# Patient Record
Sex: MALE | Race: WHITE | NOT HISPANIC OR LATINO
[De-identification: names, ages, dates, MRNs, and addresses within clinical notes are randomized per-mention and may not be internally consistent; named-entity substitution may affect disease eponyms.]

---

## 2017-03-17 ENCOUNTER — MEDICATION RENEWAL (OUTPATIENT)
Age: 22
End: 2017-03-17

## 2017-04-28 ENCOUNTER — MEDICATION RENEWAL (OUTPATIENT)
Age: 22
End: 2017-04-28

## 2017-05-01 ENCOUNTER — RX RENEWAL (OUTPATIENT)
Age: 22
End: 2017-05-01

## 2017-06-07 ENCOUNTER — APPOINTMENT (OUTPATIENT)
Dept: PEDIATRIC DEVELOPMENTAL SERVICES | Facility: CLINIC | Age: 22
End: 2017-06-07

## 2017-06-07 VITALS
DIASTOLIC BLOOD PRESSURE: 74 MMHG | SYSTOLIC BLOOD PRESSURE: 104 MMHG | HEIGHT: 67.5 IN | BODY MASS INDEX: 25.69 KG/M2 | WEIGHT: 165.6 LBS | HEART RATE: 82 BPM

## 2017-09-07 ENCOUNTER — MEDICATION RENEWAL (OUTPATIENT)
Age: 22
End: 2017-09-07

## 2017-10-24 ENCOUNTER — RX RENEWAL (OUTPATIENT)
Age: 22
End: 2017-10-24

## 2017-12-07 ENCOUNTER — MEDICATION RENEWAL (OUTPATIENT)
Age: 22
End: 2017-12-07

## 2018-02-20 ENCOUNTER — RX RENEWAL (OUTPATIENT)
Age: 23
End: 2018-02-20

## 2018-03-19 ENCOUNTER — APPOINTMENT (OUTPATIENT)
Dept: PEDIATRIC DEVELOPMENTAL SERVICES | Facility: CLINIC | Age: 23
End: 2018-03-19
Payer: COMMERCIAL

## 2018-03-19 VITALS
HEIGHT: 67.62 IN | DIASTOLIC BLOOD PRESSURE: 76 MMHG | BODY MASS INDEX: 26.22 KG/M2 | SYSTOLIC BLOOD PRESSURE: 114 MMHG | WEIGHT: 171 LBS | HEART RATE: 60 BPM

## 2018-03-19 PROCEDURE — 99214 OFFICE O/P EST MOD 30 MIN: CPT

## 2018-05-15 ENCOUNTER — RX RENEWAL (OUTPATIENT)
Age: 23
End: 2018-05-15

## 2018-06-27 ENCOUNTER — MEDICATION RENEWAL (OUTPATIENT)
Age: 23
End: 2018-06-27

## 2018-09-04 ENCOUNTER — RX RENEWAL (OUTPATIENT)
Age: 23
End: 2018-09-04

## 2018-10-10 ENCOUNTER — RX RENEWAL (OUTPATIENT)
Age: 23
End: 2018-10-10

## 2018-11-07 ENCOUNTER — MEDICATION RENEWAL (OUTPATIENT)
Age: 23
End: 2018-11-07

## 2019-01-10 ENCOUNTER — RX RENEWAL (OUTPATIENT)
Age: 24
End: 2019-01-10

## 2019-01-17 ENCOUNTER — RX RENEWAL (OUTPATIENT)
Age: 24
End: 2019-01-17

## 2019-01-18 ENCOUNTER — RX CHANGE (OUTPATIENT)
Age: 24
End: 2019-01-18

## 2019-01-22 ENCOUNTER — APPOINTMENT (OUTPATIENT)
Dept: PEDIATRIC DEVELOPMENTAL SERVICES | Facility: CLINIC | Age: 24
End: 2019-01-22
Payer: COMMERCIAL

## 2019-01-22 VITALS
HEIGHT: 67.45 IN | BODY MASS INDEX: 25.24 KG/M2 | DIASTOLIC BLOOD PRESSURE: 74 MMHG | SYSTOLIC BLOOD PRESSURE: 92 MMHG | WEIGHT: 162.7 LBS | HEART RATE: 72 BPM

## 2019-01-22 DIAGNOSIS — R53.83 OTHER FATIGUE: ICD-10-CM

## 2019-01-22 PROCEDURE — 99214 OFFICE O/P EST MOD 30 MIN: CPT

## 2019-02-20 ENCOUNTER — RX RENEWAL (OUTPATIENT)
Age: 24
End: 2019-02-20

## 2019-03-27 ENCOUNTER — MEDICATION RENEWAL (OUTPATIENT)
Age: 24
End: 2019-03-27

## 2019-04-18 ENCOUNTER — MEDICATION RENEWAL (OUTPATIENT)
Age: 24
End: 2019-04-18

## 2019-05-22 ENCOUNTER — RX RENEWAL (OUTPATIENT)
Age: 24
End: 2019-05-22

## 2019-06-17 ENCOUNTER — RX RENEWAL (OUTPATIENT)
Age: 24
End: 2019-06-17

## 2019-06-19 ENCOUNTER — APPOINTMENT (OUTPATIENT)
Dept: PEDIATRIC DEVELOPMENTAL SERVICES | Facility: CLINIC | Age: 24
End: 2019-06-19

## 2019-07-15 ENCOUNTER — APPOINTMENT (OUTPATIENT)
Dept: PEDIATRIC DEVELOPMENTAL SERVICES | Facility: CLINIC | Age: 24
End: 2019-07-15
Payer: COMMERCIAL

## 2019-07-15 VITALS
DIASTOLIC BLOOD PRESSURE: 66 MMHG | HEIGHT: 67.5 IN | WEIGHT: 160 LBS | SYSTOLIC BLOOD PRESSURE: 99 MMHG | HEART RATE: 88 BPM | BODY MASS INDEX: 24.82 KG/M2

## 2019-07-15 PROCEDURE — 99213 OFFICE O/P EST LOW 20 MIN: CPT

## 2019-07-15 PROCEDURE — XXXXX: CPT

## 2019-08-22 ENCOUNTER — RX RENEWAL (OUTPATIENT)
Age: 24
End: 2019-08-22

## 2019-09-18 ENCOUNTER — MEDICATION RENEWAL (OUTPATIENT)
Age: 24
End: 2019-09-18

## 2019-10-21 ENCOUNTER — MEDICATION RENEWAL (OUTPATIENT)
Age: 24
End: 2019-10-21

## 2019-11-25 ENCOUNTER — MEDICATION RENEWAL (OUTPATIENT)
Age: 24
End: 2019-11-25

## 2019-11-27 ENCOUNTER — MEDICATION RENEWAL (OUTPATIENT)
Age: 24
End: 2019-11-27

## 2019-11-30 ENCOUNTER — MOBILE ON CALL (OUTPATIENT)
Age: 24
End: 2019-11-30

## 2019-12-03 ENCOUNTER — MEDICATION RENEWAL (OUTPATIENT)
Age: 24
End: 2019-12-03

## 2019-12-12 ENCOUNTER — MEDICATION RENEWAL (OUTPATIENT)
Age: 24
End: 2019-12-12

## 2019-12-23 ENCOUNTER — RX RENEWAL (OUTPATIENT)
Age: 24
End: 2019-12-23

## 2019-12-24 ENCOUNTER — RX RENEWAL (OUTPATIENT)
Age: 24
End: 2019-12-24

## 2019-12-26 ENCOUNTER — RX RENEWAL (OUTPATIENT)
Age: 24
End: 2019-12-26

## 2020-04-07 ENCOUNTER — APPOINTMENT (OUTPATIENT)
Dept: PEDIATRIC DEVELOPMENTAL SERVICES | Facility: CLINIC | Age: 25
End: 2020-04-07
Payer: COMMERCIAL

## 2020-04-07 PROCEDURE — 99213 OFFICE O/P EST LOW 20 MIN: CPT | Mod: GQ

## 2020-11-11 ENCOUNTER — APPOINTMENT (OUTPATIENT)
Dept: PEDIATRIC DEVELOPMENTAL SERVICES | Facility: CLINIC | Age: 25
End: 2020-11-11
Payer: COMMERCIAL

## 2020-11-11 DIAGNOSIS — K59.09 OTHER CONSTIPATION: ICD-10-CM

## 2020-11-11 PROCEDURE — 99213 OFFICE O/P EST LOW 20 MIN: CPT | Mod: 95

## 2020-11-11 PROCEDURE — ZZZZZ: CPT

## 2021-04-22 ENCOUNTER — APPOINTMENT (OUTPATIENT)
Dept: CARDIOLOGY | Facility: CLINIC | Age: 26
End: 2021-04-22
Payer: COMMERCIAL

## 2021-04-22 ENCOUNTER — NON-APPOINTMENT (OUTPATIENT)
Age: 26
End: 2021-04-22

## 2021-04-22 VITALS
OXYGEN SATURATION: 99 % | BODY MASS INDEX: 26.06 KG/M2 | HEART RATE: 74 BPM | SYSTOLIC BLOOD PRESSURE: 136 MMHG | WEIGHT: 168 LBS | DIASTOLIC BLOOD PRESSURE: 89 MMHG | HEIGHT: 67.5 IN

## 2021-04-22 DIAGNOSIS — Z91.89 OTHER SPECIFIED PERSONAL RISK FACTORS, NOT ELSEWHERE CLASSIFIED: ICD-10-CM

## 2021-04-22 PROCEDURE — 93000 ELECTROCARDIOGRAM COMPLETE: CPT

## 2021-04-22 PROCEDURE — 99204 OFFICE O/P NEW MOD 45 MIN: CPT

## 2021-04-22 PROCEDURE — 99072 ADDL SUPL MATRL&STAF TM PHE: CPT

## 2021-06-20 ENCOUNTER — TRANSCRIPTION ENCOUNTER (OUTPATIENT)
Age: 26
End: 2021-06-20

## 2022-02-03 ENCOUNTER — APPOINTMENT (OUTPATIENT)
Dept: PEDIATRIC DEVELOPMENTAL SERVICES | Facility: CLINIC | Age: 27
End: 2022-02-03
Payer: COMMERCIAL

## 2022-02-03 PROCEDURE — 99213 OFFICE O/P EST LOW 20 MIN: CPT | Mod: 95

## 2022-06-16 ENCOUNTER — APPOINTMENT (OUTPATIENT)
Dept: PEDIATRIC DEVELOPMENTAL SERVICES | Facility: CLINIC | Age: 27
End: 2022-06-16

## 2022-06-21 ENCOUNTER — APPOINTMENT (OUTPATIENT)
Dept: ORTHOPEDIC SURGERY | Facility: CLINIC | Age: 27
End: 2022-06-21
Payer: COMMERCIAL

## 2022-06-21 VITALS — WEIGHT: 165 LBS | HEIGHT: 68 IN | BODY MASS INDEX: 25.01 KG/M2

## 2022-06-21 DIAGNOSIS — M79.18 MYALGIA, OTHER SITE: ICD-10-CM

## 2022-06-21 DIAGNOSIS — S83.511A SPRAIN OF ANTERIOR CRUCIATE LIGAMENT OF RIGHT KNEE, INITIAL ENCOUNTER: ICD-10-CM

## 2022-06-21 PROCEDURE — 99204 OFFICE O/P NEW MOD 45 MIN: CPT

## 2022-06-21 PROCEDURE — 73564 X-RAY EXAM KNEE 4 OR MORE: CPT | Mod: RT

## 2022-06-21 RX ORDER — NAPROXEN 500 MG/1
500 TABLET ORAL TWICE DAILY
Qty: 60 | Refills: 0 | Status: ACTIVE | COMMUNITY
Start: 2022-06-21 | End: 1900-01-01

## 2022-06-21 NOTE — HISTORY OF PRESENT ILLNESS
[de-identified] : 26 year old male  (LHD ,   )  right knee pain since 6/18/22 while playing basketball took a step and knee twisted and felt something deep in knee.\par The pain is located  anterior, medial and deep\par The pain is associated with instability, catching, buckling\par Worse with activity and better at rest.\par Has tried ice, advil\par

## 2022-06-21 NOTE — ASSESSMENT
[FreeTextEntry1] : Right X-Ray Examination of the KNEE (4 views): there are no fractures, subluxations or dislocations.\par \par Due to the patients instability along with pain, effusion, and pos lachman test on exam we will get an mri to eval for ACL tear\par \par - The patient was advised to apply ice (wrapped in a towel or protective covering) to the area daily (20 minutes at a time, 2-4X/day).\par - The patient was advised to modify their activities.\par - naproyn rx\par - Patient was given a prescription for an anti-inflammatory medication.  They will take it for the next week and then on an as needed basis, as long as there are no medical contra-indications.  Patient is counseled on possible GI, renal, and cardiovascular side effects.\par - f/u after mri\par

## 2022-06-21 NOTE — IMAGING
[de-identified] : \par RIGHT KNEE\par Inspection: moderate effusion\par Palpation: medial and lateral joint line tenderness\par Range of Motion: 3-125 degrees.\par Strength: Quadriceps strength is 4+/5 Hamstring strength is 5/5\par Neurological: light touch is intact throughout \par Ligament Stability and Special Tests: \par McMurrays: positive\par Lachman: positive\par Pivot Shift: positive\par Posterior Drawer: neg\par Valgus: neg\par Varus: neg\par Patella Apprehension: neg\par Patella Maltracking: neg\par

## 2022-06-28 ENCOUNTER — FORM ENCOUNTER (OUTPATIENT)
Age: 27
End: 2022-06-28

## 2022-06-29 ENCOUNTER — APPOINTMENT (OUTPATIENT)
Dept: MRI IMAGING | Facility: CLINIC | Age: 27
End: 2022-06-29

## 2022-06-29 PROCEDURE — 73721 MRI JNT OF LWR EXTRE W/O DYE: CPT | Mod: RT

## 2022-07-05 ENCOUNTER — APPOINTMENT (OUTPATIENT)
Dept: ORTHOPEDIC SURGERY | Facility: CLINIC | Age: 27
End: 2022-07-05

## 2022-07-05 VITALS — HEIGHT: 68 IN | BODY MASS INDEX: 25.01 KG/M2 | WEIGHT: 165 LBS

## 2022-07-05 DIAGNOSIS — M23.8X1 OTHER INTERNAL DERANGEMENTS OF RIGHT KNEE: ICD-10-CM

## 2022-07-05 DIAGNOSIS — M23.41 LOOSE BODY IN KNEE, RIGHT KNEE: ICD-10-CM

## 2022-07-05 PROCEDURE — 99214 OFFICE O/P EST MOD 30 MIN: CPT

## 2022-07-05 NOTE — DISCUSSION/SUMMARY
[de-identified] : The patient was advised of the diagnosis. The natural history of the pathology was explained in full to the patient in layman's terms. Several different treatment options were discussed and explained to the patient including the risks and benefits of both surgical and non-surgical treatments.\par \par The risks, benefits, and alternatives to surgical arthroscopy of the right knee with abrasion chondroplasty, loose body removal, and articular cartilage biopsy (for subsequent autologous chondrocyte implantation) were reviewed with the patient.   Specifically, I reviewed that any anterior knee pain may paradoxically worsen for the first six weeks following arthroscopy due to quadriceps weakness.  We also discussed that the risks of surgery include but are not limited to pain, infection (superficial or deep), bleeding, vascular injury, numbness, tingling, nerve damage (direct or indirect), scarring, wound breakdown, failure to resolve symptoms, symptom recurrence, the need for further surgery as well as medical complications such as blood clots, pulmonary embolism, heart attack, stroke, and other anesthesia complications including even death.  \par \par They understood all the risks, accepted them, and understood that other complications could occur that are not mentioned above.  The intraoperative plan, post-operative plan, post-operative expectations and limitations were explained in full.  Expectations from non-surgical treatment were explained in full as well.  They demonstrated a complete understanding of the treatment alternatives and requested to proceed with surgery.  This will be scheduled accordingly.\par

## 2022-07-05 NOTE — HISTORY OF PRESENT ILLNESS
[de-identified] : 26 year old male  (LHD ,   )  right knee pain since 6/18/22 while playing basketball took a step and knee twisted and felt something deep in knee.\par The pain is located  anterior, medial and deep\par The pain is associated with instability, catching, buckling\par Worse with activity and better at rest.\par Has tried ice, advil, activity mod\par \par 7/5/22 - has been mod activiyt, using ice, nsaids cont catching and locking along with buckling, had mri\par

## 2022-07-05 NOTE — ASSESSMENT
[FreeTextEntry1] : mri right knee 6/29/22 - lfc chondral defect 1.2cm AP X 1.4cm ML, 1.5cm loose body in lateral gutter, synov, eff\par \par - We discussed their diagnosis and treatment options at length. \par - Given their active lifestyle along with pain and mechanical symptoms they are a surgical candidate.\par - The risks, benefits, and alternatives to right knee abrasion chondroplasty, loose body removal and articular cartilage biopsy were discussed with the patient, all questions were answered.\par

## 2022-07-05 NOTE — IMAGING
[de-identified] : \par RIGHT KNEE\par Inspection: moderate effusion\par Palpation: medial and lateral joint line tenderness\par Range of Motion: 3-125 degrees.\par Strength: Quadriceps strength is 4+/5 Hamstring strength is 5/5\par Neurological: light touch is intact throughout \par Ligament Stability and Special Tests: \par McMurrays: positive\par Lachman: neg\par Pivot Shift: neg\par Posterior Drawer: neg\par Valgus: neg\par Varus: neg\par Patella Apprehension: neg\par Patella Maltracking: neg\par

## 2022-07-05 NOTE — DATA REVIEWED
[MRI] : MRI [Right] : of the right [Knee] : knee [I independently reviewed and interpreted images and report] : I independently reviewed and interpreted images and report

## 2022-07-19 ENCOUNTER — APPOINTMENT (OUTPATIENT)
Dept: PEDIATRIC DEVELOPMENTAL SERVICES | Facility: CLINIC | Age: 27
End: 2022-07-19

## 2022-08-08 ENCOUNTER — APPOINTMENT (OUTPATIENT)
Dept: PEDIATRIC DEVELOPMENTAL SERVICES | Facility: CLINIC | Age: 27
End: 2022-08-08

## 2022-08-15 ENCOUNTER — APPOINTMENT (OUTPATIENT)
Dept: PEDIATRIC DEVELOPMENTAL SERVICES | Facility: CLINIC | Age: 27
End: 2022-08-15

## 2022-08-15 PROCEDURE — 99214 OFFICE O/P EST MOD 30 MIN: CPT | Mod: 95

## 2022-09-02 ENCOUNTER — EMERGENCY (EMERGENCY)
Facility: HOSPITAL | Age: 27
LOS: 1 days | Discharge: HOME | End: 2022-09-02
Attending: INTERNAL MEDICINE | Admitting: EMERGENCY MEDICINE
Payer: COMMERCIAL

## 2022-09-02 VITALS
DIASTOLIC BLOOD PRESSURE: 94 MMHG | HEART RATE: 96 BPM | SYSTOLIC BLOOD PRESSURE: 143 MMHG | WEIGHT: 164.91 LBS | TEMPERATURE: 98 F | HEIGHT: 68 IN | RESPIRATION RATE: 16 BRPM | OXYGEN SATURATION: 98 %

## 2022-09-02 VITALS
RESPIRATION RATE: 16 BRPM | OXYGEN SATURATION: 98 % | HEART RATE: 87 BPM | SYSTOLIC BLOOD PRESSURE: 124 MMHG | DIASTOLIC BLOOD PRESSURE: 82 MMHG | TEMPERATURE: 98 F

## 2022-09-02 PROCEDURE — 99283 EMERGENCY DEPT VISIT LOW MDM: CPT

## 2022-09-02 PROCEDURE — 99284 EMERGENCY DEPT VISIT MOD MDM: CPT

## 2022-09-02 RX ORDER — ACETAMINOPHEN 500 MG
650 TABLET ORAL ONCE
Refills: 0 | Status: COMPLETED | OUTPATIENT
Start: 2022-09-02 | End: 2022-09-02

## 2022-09-02 RX ORDER — IBUPROFEN 200 MG
600 TABLET ORAL ONCE
Refills: 0 | Status: COMPLETED | OUTPATIENT
Start: 2022-09-02 | End: 2022-09-02

## 2022-09-02 RX ORDER — AZTREONAM 2 G
1 VIAL (EA) INJECTION
Qty: 14 | Refills: 0
Start: 2022-09-02 | End: 2022-09-08

## 2022-09-02 RX ADMIN — Medication 600 MILLIGRAM(S): at 21:10

## 2022-09-02 RX ADMIN — Medication 650 MILLIGRAM(S): at 21:28

## 2022-09-02 RX ADMIN — Medication 1 TABLET(S): at 19:48

## 2022-09-02 RX ADMIN — Medication 600 MILLIGRAM(S): at 20:12

## 2022-09-02 RX ADMIN — Medication 650 MILLIGRAM(S): at 22:45

## 2022-09-02 NOTE — ED PROVIDER NOTE - PHYSICAL EXAMINATION
Constitutional: Awake, Alert, non-toxic. NAD. Well appearing, well nourished.   HEAD: Normocephalic, atraumatic.   EYES: PERRL, EOM intact, conjunctiva and sclera are clear bilaterally. No raccoon eyes.   ENT: No rizzo sign. No rhinorrhea, normal pharynx, patent, no tonsillar exudate or enlargement, mucous membranes pink/moist, no erythema, no drooling or stridor.   NECK: Supple, non-tender; no cervical LAD, no JVD, no goiter.  BACK: No midline or paraspinal TTP of cervical/thoracic/lumbar spine, FROM. No ecchymosis or hematomas. (+) abrasion to right upper back without TTP.   CARDIOVASCULAR: Normal S1, S2; regular rate and rhythm.  RESPIRATORY: Normal respiratory effort; breath sounds CTAB, no wheezes, rhonchi, or rales. Speaking in full sentences. No accessory muscle use.   ABDOMEN: Soft; non-tender, non-distended, no CVA TTP  EXTREMITIES: Full passive and active ROM in all extremities; (+) right knee surgical wound with minimal opening to mid wound, no discharge, no active bleeding, knee and hips non-tender to palpation; distal pulses palpable and symmetric, no edema, no crepitus or step off  SKIN: Warm, dry; good skin turgor, no apparent lesions or rashes, no ecchymosis, brisk capillary refill.  NEURO: A&O x3. Sensory and motor functions are grossly intact. Speech is normal. Appearance and judgement seem appropriate for gender and age. No neurological deficits. Neurovascular sensation intact motor function 5/5 of upper and lower extremities

## 2022-09-02 NOTE — ED PROVIDER NOTE - OBJECTIVE STATEMENT
26 y/o male with PMHx Autism presents today due to fall on steps. Reports fell onto back and opened right knee wound. Reports he had surgery 8 days ago on right knee. Reports they sent photo to orthopedic who requested steri-strips, dressing, and abx. Denies head injury, back pain, hip pain, rib pain, cp, sob, knee pain, numbness/weakness, or any other complaints.

## 2022-09-02 NOTE — ED ADULT TRIAGE NOTE - NS ED NURSE BANDS TYPE
Patient missed a HHA} visit from [facility] on 12/20/2021     Reason: pt was sick with covid and pt daughter said she was feeling to bad       For your records only.   As per home health protocol, MD must be notified of missed/cancelled visits; therefore the prescribed frequency was not met. 
Name band;

## 2022-09-02 NOTE — ED PROVIDER NOTE - CLINICAL SUMMARY MEDICAL DECISION MAKING FREE TEXT BOX
28 y/o male with PMHx   right knee arthroscopic surgery 8 days ago, He fell while attempting to ambulate and his surgical wound partially opened  Denies head injury, back pain, hip pain, rib pain, cp, sob, knee pain, numbness/weakness, or any other complaints. We contacted the patients orthopedist who suggested that the surgical wound be reinforced with Steri-strips, he was stable at discharge with O/P with his orthopedist

## 2022-09-02 NOTE — ED ADULT NURSE NOTE - OBJECTIVE STATEMENT
patient has right knee replacement surgery last week and fell today while coming down stairs, fell backward and hit his shoulder, denies knee trauma but stitches opened and started bleeding from the suture. no loc, pending MD's eval. will continue to monitor.

## 2022-09-02 NOTE — ED PROVIDER NOTE - PROGRESS NOTE DETAILS
Steri-strips applied with dressing and ace bandage. All questions were answered. Discussed the importance of prompt, close medical follow-up. Patient will return with any changes, concerns or persistent/worsening symptoms.  Patient verbalized understanding.

## 2022-09-02 NOTE — ED ADULT NURSE NOTE - NSIMPLEMENTINTERV_GEN_ALL_ED
Implemented All Fall with Harm Risk Interventions:  [place] to call system. Call bell, personal items and telephone within reach. Instruct patient to call for assistance. Room bathroom lighting operational. Non-slip footwear when patient is off stretcher. Physically safe environment: no spills, clutter or unnecessary equipment. Stretcher in lowest position, wheels locked, appropriate side rails in place. Provide visual cue, wrist band, yellow gown, etc. Monitor gait and stability. Monitor for mental status changes and reorient to person, place, and time. Review medications for side effects contributing to fall risk. Reinforce activity limits and safety measures with patient and family. Provide visual clues: red socks.

## 2022-09-02 NOTE — ED ADULT NURSE REASSESSMENT NOTE - AS TEMP SITE
8/25 Assessed, confirmed PCP and address. Pt has cane available. Pt lives alone but has siblings available in the area. Pt is [employer] and drives. Pt offered transitional visit but declined. magda  
oral

## 2022-09-02 NOTE — ED PROVIDER NOTE - MUSCULOSKELETAL NEGATIVE STATEMENT, MLM
Include Rx 2 When Rendering Additional Prescriptions: No Treatment Device Design After Initial Simulation Justification (Will Render If Bill For Treatment Devices = Yes): The patient is status post radiation simulation and is evaluated as to the use of additional devices for shielding and placement for radiation therapy. Computed Treatment Time In Min (Will Render The Same As Calculated Treatment Time If Left Blank): per device Fractionation Number (Evaluation): 10 Assessment: Appropriate reaction Treatment Margins In Cm: 0.5 Intro Statement (Will Not Render If Left Blank): The patient is undergoing superficial radiation therapy for skin cancer and presents for weekly evaluation and management.  Per protocol and as documented on the flow sheet, the patient was questioned as to subjective redness, pruritus, pain, drainage, fatigue, or any other symptoms.  Objectively, the radiation area was evaluated with regards to erythema, atrophy, scale, crusting, erosion, ulceration, edema, purpura, tenderness, warmth, drainage, and any other findings.  The plan was extensively reviewed including the dose, and dosing schedule.  The simulation and clinical setup was also reviewed as was the external and any internal shields and based on this review the appropriateness and sufficiency of treatment was determined. Number Of Days Off Treatment: 1 Total Number Of Fractions Rx 4: 15 Bill For Radiation Treatment: Yes Daily Fractionated Dose (Optional- Include Units): 272.58cGy Depth (Optional-Please Include Units): 1.56mm Information: Selecting Yes will display possible errors in your note based on the variables you have selected. This validation is only offered as a suggestion for you. PLEASE NOTE THAT THE VALIDATION TEXT WILL BE REMOVED WHEN YOU FINALIZE YOUR NOTE. IF YOU WANT TO FAX A PRELIMINARY NOTE YOU WILL NEED TO TOGGLE THIS TO 'NO' IF YOU DO NOT WANT IT IN YOUR FAXED NOTE. Shielding Size (Optional- Include Units): 3.0 x 3.0 Fractions / Week: 3 Fractions / Week Rx 2: 2 Fractions / Week Rx 3: 5 Treatment Time / Fractionation (Optional- Include Units): 0.42 Energy (Optional-Please Include Units): 70kV Custom Shielding Preamble Text Will Not Be Included With Simple Simulations (.......... X X Y Cm): A lead shield of 0.762 mm thickness is utilized to form a molded, custom shield with a Treatment Time / Fractionation (Optional- Include Units) Rx 2: 0.43 Detail Level: Detailed Time Dose Fractionation (Optional- Include Units If Applicable) Rx 2: 93 Simple Simulation Preamble Text Will Be Included With Simple Simulations (.......... Indications): Simple simulation was performed today for the following reasons: Total Number Of Fractions: 20 Field Size (Applicator): 4.0 cm Patient Positioning: Supine Dose / Tx In Cgy (Optional): 272.58 Custom Shielding Afterword Text Will Not Be Included With Simple Simulations (X X Y Cm............): port to correlate with the lesion size, including treatment margin. The custom lead shield is adequate to accommodate the appropriate applicator and provide adequate shielding around the treatment site. Additional shielding (as noted below) is used to protect sensitive, normal tissues. Day Of The Week Treatment Administered: Monday Additional Prescription Justification Text: If there is any interruption in treatment exceeding 5 days please see Decay and Dose Adjustment Calculation and complete treatment under Prescription 2. Daily Fractionated Dose (Optional- Include Units) Rx 2: 279.07cGy Simple Simulation Afterword Text Will Be Included With Simple Simulations (Indications............): The patient had a complete consultation regarding all applicable modalities for the treatment of their skin cancer and based on a variety of factors including the type of tumor, size, and location, the relevant medical history as well as local tissue factors, the functional status of the individual, the ability to perform necessary postoperative wound instructions and the need for simultaneous treatments as well as overall wound healing status, it was determined that the patient would begin radiation therapy treatment for skin cancer.  A full simulation and treatment device design was performed including the determination and formulation of appropriate simple and complex devices including lead shield of 0.762 mm thickness to form molded customized shielding to specifically correlate with the lesion size including treatment margin.  The custom lead shield is adequate to accommodate the appropriate applicator and provide adequate shielding around the treatment site.  The specific field applicator, shields, and devices both simple and complex as well as the specific patient setup is outlined below.  The patient was given a full consent for superficial radiation to both verbally and in writing and the full determination of patient's eligibility for treatment and selection is outlined on the patient eligibility and treatment selection form.  The specific superficial radiotherapy prescription was determined and was documented on the superficial radiotherapy prescription form.  A treatment calculation was also performed and documented on the treatment calculation form.  Based on the prescription, the patient was scheduled for a series of fractional treatments. Total Dose (Optional-Please Include Units): 5451.6cGy Cumulative Dose In Cgy (Optional): 4088.7 Total Dose (Optional-Please Include Units) Rx 2: 2790.7cGy Functional Status: 3 (limited, performs self-care) Energy (Optional-Please Include Units) Rx 2: 70 kV Time Dose Fractionation (Optional- Include Units If Applicable): 96 Comments: On [employer], RTOG 1 no back pain, no gout, no musculoskeletal pain, no neck pain, and no weakness.

## 2022-09-02 NOTE — ED PROVIDER NOTE - CARE PROVIDER_API CALL
Kai Morgan)  Orthopaedic Surgery  [address] [place] [address]  Phone: [phone]  Fax: [phone]  Follow Up Time: 1-3 Days

## 2022-09-02 NOTE — ED PROVIDER NOTE - NSFOLLOWUPINSTRUCTIONS_ED_ALL_ED_FT
1) Follow-up with Orthopedics, See referred doctor. Call today/next business day for close, prompt follow-up.  2) Return to Emergency room for any worsening or persistent pain, weakness, numbness, fever, color change to extremity, or any other concerning symptoms.  3) Take ibuprofen 600 mg every  6 hours as needed.   4) You can consider discussing with your doctor if physical therapy or further imaging as an MRI may be beneficial.     RETURN FOR ANY SIGNS OF INFECTION (Redness/swelling/discharge/fever). Antibiotics were sent to your pharmacy.

## 2022-09-02 NOTE — ED PROVIDER NOTE - NS ED ATTENDING STATEMENT MOD
This was a shared visit with the MACIEL. I reviewed and verified the documentation and independently performed the documented:

## 2022-09-02 NOTE — ED ADULT NURSE REASSESSMENT NOTE - NS ED NURSE REASSESS COMMENT FT1
pt c/o burning sensation to right medial ankle, MD aware and evaluated by Dr. Loya at bedside,  pt felt better after knee immobilizer slightly loosened . EMS at bedside, report given . pt left ED via ambulance in no apparent distress.
pt received at change of shift, resting on stretcher, bed in low position, call bell within reach, right knee dressing and knee brace in place, pt reevaluated by PA and d/c home, pt awaiting EMS transport, plan of care discussed, will monitor.

## 2022-09-02 NOTE — ED PROVIDER NOTE - PATIENT PORTAL LINK FT
You can access the FollowMyHealth Patient Portal offered by [facility] by registering at the following website: [email or web address]. By joining [facility] [employer]’s FollowMyHealth portal, you will also be able to view your health information using other applications (apps) compatible with our system.

## 2022-11-09 ENCOUNTER — APPOINTMENT (OUTPATIENT)
Dept: PEDIATRIC DEVELOPMENTAL SERVICES | Facility: CLINIC | Age: 27
End: 2022-11-09

## 2022-11-09 PROCEDURE — 99214 OFFICE O/P EST MOD 30 MIN: CPT | Mod: 95

## 2022-11-09 NOTE — SOCIAL HISTORY
[FreeTextEntry6] : Isreal lives in his father's home.\par Mother: Working in plastic surgeon's office in [place]; now travels to different locations. \par Father finance ([employer] in [place])\par Sister -- Katie -- graduated [employer] May, 2018; now living in [place].\par \par Dad's HH:\par Step-sibs: \par Niccola -- Stage 4 breast cancer; mixed results with tx; (born 1993); \par Flower (b 2001)\par Father's god daughter and her  and daughter (b 2016) moved in in November., 2019

## 2022-11-09 NOTE — PLAN
[FreeTextEntry3] : Resume Focalin XR 40 mg daily;\par Isreal will decide if he prefers 35 mg or 40 mg dose.\par Answered all questions\david Isreal is still trying to identify who will assume follow-up care for his ADHD\par Office follow: 3 months (after his recovering from his knee surgery and returning to work)only as needed\par Telephone follow-up: only as needed.

## 2022-11-09 NOTE — REASON FOR VISIT
[Follow-Up Visit] : a follow-up visit [Home] : at home, [unfilled] , at the time of the visit. [Other Location: e.g. Home (Enter Location, City,State)___] : at [unfilled] [Verbal consent obtained from patient] : the patient, [unfilled] [FreeTextEntry2] : ADHD, medication monitoring and management [FreeTextEntry4] : Focalin XR 40 mg. in AM every day. (see note) [FreeTextEntry1] : Self (via telehealth; consent obtained) [FreeTextEntry5] : n/a [FreeTextEntry3] : November, 2020

## 2022-11-09 NOTE — HISTORY OF PRESENT ILLNESS
[FreeTextEntry5] : Graduated from [employer], May 2017. Double-majored in Accounting and Finance; also unofficially had a minor in Economics.  [FreeTextEntry1] : Isreal is still working as an ; he started working at [employer]'s firm in June 2017. [employer]'s firm is [employer] in [place] ( company).  Isreal was promoted in June 2020 to  status, which included some training responsibilities for new hires.\par \par When seen in August, 2022, Isreal said that he had had a harder time focusing; he attributed this to him not being able to play sports due to his knee issues.   Isreal said then that his medication still makes a big difference for him for work.  He says that if he does not take his medication, he could not focus at all.  Isreal says that he never misses a dose.  In terms of AE's, appetite loss is the main AE.  He denies any current significant sleep issues related to his MPH.\par \par Of recent, with the dose increase to 40 mg, he thought it was somewhat more helpful.  Of recent, he has been taking 35 mg. due to a prescription issue with the 35 mg.  He says that he will be getting his 40mg capsules today and will resume this higher dose and re-evaluate whether the 40 mg dose is better for him or not. 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 In the past, he has considered going for a Master's in either Accounting or Finance. \par \par Isreal is still living with his dad.  Socially, Isreal says he has a nice small [place] of friends from ; he has remained close with them.  Isreal says that he has not kept up with as many of his college friends as much.  He has dated a little -- but not recently (too busy at work). In the past, he had trouble getting past the second date.  \par \par Mom moved to [place] in June 2018.   Living in a town house in the same [facility] community as her own parents -- who are 5 minutes away.  Mom is now working for a dermatologist (previously worked for plastic surgery [employer]).  Her health is reportedly OK. \par \par Isreal has his own car.  Still no tickets or accidents.\par \par Occasional alcohol; denies smoking pot, but tried edibles x 2 when in [place]. [Major Illness] : no major illness [Major Injury] : no major injury [Surgery] : no surgery [Hospitalizations] : no hospitalizations [New Medications] : no new medication [New Allergies] : no new allergies [FreeTextEntry6] : PCP: Dr. Vogel (family physician)\par Annual visit: February, 2022.\par COVID - infection in June; home for 2 weeks.no long COVID sx.\par Knee surgery - 1st of 2 procedures done in mid-July, 2022; 2nd procedure done on 8/25/22 with several month recovery period - Crutches x several weeks; PT.\par Needs his flu shot.  \par Constipation: Isreal thinks that the Focalin causes mild constipation.  He takes fiber pills in AM, takes Miralax a little more frequent now.\par No other medications.

## 2023-03-30 ENCOUNTER — TRANSCRIPTION ENCOUNTER (OUTPATIENT)
Age: 28
End: 2023-03-30

## 2023-03-30 ENCOUNTER — APPOINTMENT (OUTPATIENT)
Dept: PEDIATRIC DEVELOPMENTAL SERVICES | Facility: CLINIC | Age: 28
End: 2023-03-30
Payer: COMMERCIAL

## 2023-03-30 DIAGNOSIS — F84.0 AUTISTIC DISORDER: ICD-10-CM

## 2023-03-30 PROCEDURE — 99214 OFFICE O/P EST MOD 30 MIN: CPT | Mod: 95

## 2023-03-30 NOTE — PLAN
[FreeTextEntry3] : Continue Focalin XR 40 mg daily\par Answered all questions\par Counseled at length about dating and encouraged him to continue to try dating\par Isreal thinks he may have found a doctor to assume follow-up care for his ADHD; offered to refer to Reji Rowell.\par Office follow: 3 months (after his recovering from his knee surgery and returning to work) only as needed\par Telephone follow-up: only as needed.

## 2023-03-30 NOTE — SOCIAL HISTORY
[FreeTextEntry6] : Isreal lives in his father's home.\par Mother: Working in plastic surgeon's office in [place]; now travels to different locations. \par Father finance ([employer] in [place])\par Sister -- Katie -- graduated [employer] May, 2018; now living in [place].\par \par Dad's HH:\par Step-sibs: \par Niccola -- Stage 4 breast cancer; mixed results with tx; (born 1993); \par Flower (b 2001)\par Father's god daughter and her  and daughter (b 2016) moved in in November., 2019

## 2023-03-30 NOTE — HISTORY OF PRESENT ILLNESS
[FreeTextEntry5] : Graduated from [employer], May 2017. Double-majored in Accounting and Finance; also unofficially had a minor in Economics.  [FreeTextEntry1] : Isreal is still working as an  in his Dad's firm since June 2017. Dad's firm is [employer] in [place] ( company).  Isreal was promoted in June 2020 to  status, which included some training responsibilities for new hires.\par \par Isreal continues on Focalin XR 40 mg.  He says that the medication makes a big difference for him.  He says that he does have some concentration issues -- partly because he is distracted by body soreness due to him being physically active after not having been very active following his 6-month surgery recovery period, which ended in late February.  \par \par In the past, Isreal said that if he does not take his medication, he could not focus at all and that he never misses a dose.  In terms of AE's, appetite loss is the main AE. 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 In the past, he has considered going for a Master's in either Accounting or Finance. \par \par Isreal is still living with his dad. Socially, Isreal says he has a nice small [place] of friends from ; he has remained close with them.  Isreal says that he has not kept up with as many of his college friends as much. \par \par In terms of dating, Isreal has dated a little -- but not recently in the wake of his MGF passing.  He says that he has trouble getting past the second date.\par \par Mom moved to [place] in June 2018.   Living in a town house in the same [facility] community as her own parents -- who are 5 minutes away.  Her father passed away very recently (2/23?) Mom is now working for a dermatologist (previously worked for plastic surgery company).  Her health is reportedly OK. \par \par Isreal has his own car.  Still no tickets or accidents.\par \par Occasional alcohol; denies smoking pot, but tried edibles x 2 when in [place]. [Major Illness] : no major illness [Major Injury] : no major injury [Surgery] : no surgery [Hospitalizations] : no hospitalizations [New Medications] : no new medication [New Allergies] : no new allergies [FreeTextEntry6] : PCP: Dr. Vogel (family physician).\par Annual visit: February, 2022; pre-surgical eval - 8/22; 2023 Annual - TBD\par COVID - infection in June; home for 2 weeks.no long COVID sx.\par Knee surgery - 1st of 2 procedures done in mid-July, 2022; 2nd procedure done on 8/25/22 with 6-month recovery period - Crutches x several weeks; PT.\par No longer has issues with constipation: no longer takes fiber pills or Miralax\par No other medications.

## 2023-03-30 NOTE — REASON FOR VISIT
[Follow-Up Visit] : a follow-up visit [Home] : at home, [unfilled] , at the time of the visit. [Other Location: e.g. Home (Enter Location, City,State)___] : at [unfilled] [Verbal consent obtained from patient] : the patient, [unfilled] [FreeTextEntry2] : ADHD, medication monitoring and management [FreeTextEntry4] : Focalin XR 40 mg. in AM every day. (see note) [FreeTextEntry1] : Self (via telehealth; consent obtained) [FreeTextEntry5] : n/a [FreeTextEntry3] : November, 2022

## 2023-07-26 ENCOUNTER — APPOINTMENT (OUTPATIENT)
Dept: PEDIATRIC DEVELOPMENTAL SERVICES | Facility: CLINIC | Age: 28
End: 2023-07-26
Payer: COMMERCIAL

## 2023-07-26 DIAGNOSIS — F98.8 OTHER SPECIFIED BEHAVIORAL AND EMOTIONAL DISORDERS WITH ONSET USUALLY OCCURRING IN CHILDHOOD AND ADOLESCENCE: ICD-10-CM

## 2023-07-26 DIAGNOSIS — Z79.899 OTHER LONG TERM (CURRENT) DRUG THERAPY: ICD-10-CM

## 2023-07-26 PROCEDURE — 99213 OFFICE O/P EST LOW 20 MIN: CPT | Mod: 95

## 2023-07-26 NOTE — REASON FOR VISIT
[Follow-Up Visit] : a follow-up visit [FreeTextEntry2] : ADHD, medication monitoring and management [FreeTextEntry4] : Focalin XR 40 mg. in AM every day. (see note) [FreeTextEntry1] : Self (via telehealth; consent obtained) [FreeTextEntry5] : n/a [FreeTextEntry3] : March, 2023 [Home] : at home, [unfilled] , at the time of the visit. [Other Location: e.g. Home (Enter Location, City,State)___] : at [unfilled] [Verbal consent obtained from patient] : the patient, [unfilled]

## 2023-07-26 NOTE — SOCIAL HISTORY
[FreeTextEntry6] : Isreal lives in his father's home.\par Mother: Working in plastic surgeon's office in [place]; now travels to different locations. \par Father finance ([employer] in [place])\par Sister -- Katie -- graduated [employer] May, 2018; now living in [place].\par \par Dad's HH:\par Step-sibs: \par Niccola -- Stage 4 breast cancer; mixed results with tx; (born 1993); \par Flower (b 2001)\par Father's god daughter and her  and daughter (b 2016) moved in in November., 2019

## 2023-07-26 NOTE — PLAN
[FreeTextEntry3] : Continue Focalin XR 40 mg daily\par Answered all questions\par Office follow: only as needed; Isreal has an appointment with a psychiatrist (?) in August or September (?) to take over his med management; this was arranged by his mother. \par Telephone follow-up: only as needed.
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
## 2023-09-18 RX ORDER — DEXMETHYLPHENIDATE HYDROCHLORIDE 40 MG/1
40 CAPSULE, EXTENDED RELEASE ORAL DAILY
Qty: 30 | Refills: 0 | Status: ACTIVE | COMMUNITY
Start: 2022-08-15 | End: 1900-01-01

## 2023-09-19 ENCOUNTER — NON-APPOINTMENT (OUTPATIENT)
Age: 28
End: 2023-09-19

## 2024-11-07 ENCOUNTER — NON-APPOINTMENT (OUTPATIENT)
Age: 29
End: 2024-11-07

## 2024-11-08 ENCOUNTER — NON-APPOINTMENT (OUTPATIENT)
Age: 29
End: 2024-11-08

## 2024-11-08 ENCOUNTER — APPOINTMENT (OUTPATIENT)
Dept: CARDIOLOGY | Facility: CLINIC | Age: 29
End: 2024-11-08
Payer: COMMERCIAL

## 2024-11-08 VITALS
WEIGHT: 175 LBS | BODY MASS INDEX: 26.52 KG/M2 | OXYGEN SATURATION: 96 % | SYSTOLIC BLOOD PRESSURE: 117 MMHG | HEIGHT: 68 IN | HEART RATE: 64 BPM | DIASTOLIC BLOOD PRESSURE: 74 MMHG

## 2024-11-08 DIAGNOSIS — R07.89 OTHER CHEST PAIN: ICD-10-CM

## 2024-11-08 DIAGNOSIS — E78.5 HYPERLIPIDEMIA, UNSPECIFIED: ICD-10-CM

## 2024-11-08 PROCEDURE — 99204 OFFICE O/P NEW MOD 45 MIN: CPT

## 2024-11-08 PROCEDURE — G2211 COMPLEX E/M VISIT ADD ON: CPT | Mod: NC

## 2024-11-08 PROCEDURE — 93000 ELECTROCARDIOGRAM COMPLETE: CPT

## 2025-01-21 ENCOUNTER — APPOINTMENT (OUTPATIENT)
Dept: CARDIOLOGY | Facility: CLINIC | Age: 30
End: 2025-01-21

## 2025-04-15 ENCOUNTER — APPOINTMENT (OUTPATIENT)
Dept: CARDIOLOGY | Facility: CLINIC | Age: 30
End: 2025-04-15
Payer: COMMERCIAL

## 2025-04-15 PROCEDURE — 93015 CV STRESS TEST SUPVJ I&R: CPT

## 2025-04-15 PROCEDURE — 93306 TTE W/DOPPLER COMPLETE: CPT
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
